# Patient Record
Sex: MALE | Race: WHITE
[De-identification: names, ages, dates, MRNs, and addresses within clinical notes are randomized per-mention and may not be internally consistent; named-entity substitution may affect disease eponyms.]

---

## 2021-09-08 ENCOUNTER — APPOINTMENT (OUTPATIENT)
Dept: DERMATOLOGY | Facility: CLINIC | Age: 31
End: 2021-09-08
Payer: COMMERCIAL

## 2021-09-08 ENCOUNTER — LABORATORY RESULT (OUTPATIENT)
Age: 31
End: 2021-09-08

## 2021-09-08 ENCOUNTER — NON-APPOINTMENT (OUTPATIENT)
Age: 31
End: 2021-09-08

## 2021-09-08 VITALS — HEIGHT: 73 IN | BODY MASS INDEX: 41.75 KG/M2 | WEIGHT: 315 LBS

## 2021-09-08 DIAGNOSIS — Z84.0 FAMILY HISTORY OF DISEASES OF THE SKIN AND SUBCUTANEOUS TISSUE: ICD-10-CM

## 2021-09-08 DIAGNOSIS — D22.9 MELANOCYTIC NEVI, UNSPECIFIED: ICD-10-CM

## 2021-09-08 DIAGNOSIS — Z91.89 OTHER SPECIFIED PERSONAL RISK FACTORS, NOT ELSEWHERE CLASSIFIED: ICD-10-CM

## 2021-09-08 DIAGNOSIS — F17.200 NICOTINE DEPENDENCE, UNSPECIFIED, UNCOMPLICATED: ICD-10-CM

## 2021-09-08 DIAGNOSIS — D48.9 NEOPLASM OF UNCERTAIN BEHAVIOR, UNSPECIFIED: ICD-10-CM

## 2021-09-08 DIAGNOSIS — L81.4 OTHER MELANIN HYPERPIGMENTATION: ICD-10-CM

## 2021-09-08 PROBLEM — Z00.00 ENCOUNTER FOR PREVENTIVE HEALTH EXAMINATION: Status: ACTIVE | Noted: 2021-09-08

## 2021-09-08 PROCEDURE — 99203 OFFICE O/P NEW LOW 30 MIN: CPT | Mod: 25

## 2021-09-08 PROCEDURE — 11102 TANGNTL BX SKIN SINGLE LES: CPT

## 2021-09-08 NOTE — HISTORY OF PRESENT ILLNESS
[FreeTextEntry1] : growth [de-identified] : 31 year old with growth on left eyelid. enlarging. no tx tried.

## 2021-09-08 NOTE — PHYSICAL EXAM
[FreeTextEntry3] : AAOx3, pleasant, NAD, no visual lymphadenopathy\par hair, scalp, face, nose, eyelids, ears, lips, oropharynx, neck, chest, abdomen, back, right arm, left arm, nails, and hands examined with all normal findings,\par pertinent findings include:\par \par multiple benign nevi and lentigines\par verrucous papule on left lateral canthus

## 2021-09-08 NOTE — ASSESSMENT
[FreeTextEntry1] : 1) benign findings as above- education\par \par 2) Shave bx location left lateral canthus\par diagnosis: r/o inflamed FEP\par  \par Shave biopsy performed today over above location, risks and benefits discussed including incomplete removal, not enough tissue for diagnosis scarring and infection, informed consent obtained, pictures taken,  cleaned with alcohol and anesthetized with 1%lido+epi, 0.3 cc total, hemostasis obtained with cautery, vaseline and bandaid placed, tolerated well, wound care reviewed, specimen sent to pathology.\par \par

## 2021-09-24 ENCOUNTER — NON-APPOINTMENT (OUTPATIENT)
Age: 31
End: 2021-09-24

## 2023-08-30 ENCOUNTER — EMERGENCY (EMERGENCY)
Facility: HOSPITAL | Age: 33
LOS: 0 days | Discharge: HOME | End: 2023-08-31
Attending: EMERGENCY MEDICINE
Payer: COMMERCIAL

## 2023-08-30 VITALS
SYSTOLIC BLOOD PRESSURE: 174 MMHG | HEART RATE: 75 BPM | HEIGHT: 73 IN | WEIGHT: 259.93 LBS | DIASTOLIC BLOOD PRESSURE: 112 MMHG | TEMPERATURE: 98 F | OXYGEN SATURATION: 97 % | RESPIRATION RATE: 18 BRPM

## 2023-08-30 DIAGNOSIS — S61.011A LACERATION WITHOUT FOREIGN BODY OF RIGHT THUMB WITHOUT DAMAGE TO NAIL, INITIAL ENCOUNTER: ICD-10-CM
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
## 2023-08-30 PROCEDURE — 73140 X-RAY EXAM OF FINGER(S): CPT | Mod: LT

## 2023-08-30 PROCEDURE — 99284 EMERGENCY DEPT VISIT MOD MDM: CPT | Mod: 25

## 2023-08-30 PROCEDURE — 99283 EMERGENCY DEPT VISIT LOW MDM: CPT | Mod: 25

## 2023-08-30 PROCEDURE — 12001 RPR S/N/AX/GEN/TRNK 2.5CM/<: CPT

## 2023-08-31 VITALS
TEMPERATURE: 98 F | RESPIRATION RATE: 18 BRPM | SYSTOLIC BLOOD PRESSURE: 149 MMHG | HEART RATE: 64 BPM | OXYGEN SATURATION: 100 % | DIASTOLIC BLOOD PRESSURE: 89 MMHG

## 2023-08-31 PROCEDURE — 73140 X-RAY EXAM OF FINGER(S): CPT | Mod: 26,LT

## 2023-08-31 RX ORDER — CEPHALEXIN 500 MG
1 CAPSULE ORAL
Qty: 10 | Refills: 0
Start: 2023-08-31 | End: 2023-09-04

## 2023-08-31 NOTE — ED PROVIDER NOTE - BIRTH SEX
Noted.  I am unsure of any details of pt's court dates or why he needs to miss them - this has never been discussed with the pt previously. Will need more information. Is anyone else in Dr. Mary Marcelo office that can give an order in his absence for a peritoneal drain? Would prefer that GI be managing this, if he is not yet in Hospice, as they are ordering current paracentesis. Male

## 2023-08-31 NOTE — ED PROVIDER NOTE - NSFOLLOWUPINSTRUCTIONS_ED_ALL_ED_FT
Please note that you have a suture placed in your thumb. This suture needs to be removed in 5-7 days. Please return to us immediately if you have any worsening of the symptoms or if any health concerns. Please take the antibiotics as prescribed. Please avoid contact with ocean water, sea critters, or soil. If any signs of infection return to us. Please make sure you see your primary care provider or an orthopedic hand doctor. If you do not have one then I have provided you with the number for one.     Finger Laceration    AMBULATORY CARE:    A finger laceration is a deep cut in your skin. Your blood vessels, bones, joints, tendons, or nerves may also be injured.    Signs and symptoms: Your symptoms may depend on whether nerves, tendons, or deeper tissues were injured. You may have any of the following:    A cut, tear, or gash in your finger    Bleeding, swelling, or pain    Numbness or tingling in your finger    Trouble moving your finger  Seek care immediately if:    Your wound comes apart.    Blood soaks through your bandage.    You have severe pain in your finger or hand.    Your finger is pale and cold.    You have sudden trouble moving your finger.    Your swelling suddenly gets worse.    You have red streaks on your skin coming from your wound.  Call your doctor or hand specialist if:    You have new numbness or tingling.    Your finger feels warm, looks swollen or red, and is draining pus.    You have a fever.    You have questions or concerns about your condition or care.  Treatment for a finger laceration will depend on how large and deep the laceration is. It also depends on whether you have damage to deeper tissues. You may need any of the following:    Pressure may be applied to stop any bleeding.    Wound cleaning may be needed to remove dirt or debris. This will decrease the risk of infection. Your healthcare provider may need to look in your laceration for foreign objects or damage to deeper tissues. Before your laceration is cleaned and checked, you may be given medicine to numb the area. You may also be given medicine to help you relax.    Wound closure with stitches, medical glue, or Steri-Strips™ may be needed. These help the wound close and heal. A splint may be placed over your stitches, glue, or Steri-Strips. This will help decrease stress on the wound and prevent it from coming apart.        Medicine may be given to treat pain or decrease your risk for infection. You may also be given a tetanus shot. Your healthcare provider will decide if you need a tetanus shot. Wounds at high risk for tetanus infection include wounds with dirt or saliva in them. Tell your healthcare provider if you have had the tetanus vaccine or a booster within the last 5 years.    Surgery may be needed to clean your wound and remove foreign objects. Surgery may also be needed to repair injuries to tendons, nerves, or bones.  Self-care:    Apply ice on your finger for 15 to 20 minutes every hour or as directed. Use an ice pack, or put crushed ice in a plastic bag. Cover it with a towel before you apply it to your skin. Ice helps prevent tissue damage and decreases swelling and pain.    Elevate your hand above the level of your heart as often as you can. This will help decrease swelling and pain. Prop your hand on pillows or blankets to keep it elevated comfortably.    Wear your splint as directed. A splint will decrease movement and stress on your wound. The splint may help your wound heal faster. Ask your healthcare provider how to apply and remove a splint.    Apply ointments to decrease scarring. Do not apply ointments until your healthcare provider says it is okay. You may need to wait until your wound is healed. Ask which ointment to buy and how often to use it.  Wound care:    Do not get your wound wet until your healthcare provider says it is okay. Do not soak your hand in water. Do not go swimming until your healthcare provider says it is okay. When your healthcare provider says it is okay, carefully wash around the wound with soap and water. Let soap and water run over your wound. Gently pat the area dry or allow it to air dry.    Change your bandages when they get wet, dirty, or after washing. Apply new, clean bandages as directed. Do not apply elastic bandages or tape too tightly. Do not put powders or lotions on your wound.    Apply antibiotic ointment as directed. Your healthcare provider may give you antibiotic ointment to put over your wound if you have stitches. If you have Strips-Strips™ over your wound, let them dry up and fall off on their own. If they do not fall off within 14 days, gently remove them. If you have glue over your wound, do not remove or pick at it. If your glue comes off, do not replace it with glue that you have at home.    Check your wound every day for signs of infection. Signs of infection include swelling, redness, or pus.  Follow up with your doctor or hand specialist in 2 days: Write down your questions so you remember to ask them during your visits.
36

## 2023-08-31 NOTE — ED PROVIDER NOTE - PROGRESS NOTE DETAILS
Kenton CRUZ: Patient s/p laceration repair, one suture placed, 5-0 prolene, sterile,  using digital block.

## 2023-08-31 NOTE — ED PROVIDER NOTE - CLINICAL SUMMARY MEDICAL DECISION MAKING FREE TEXT BOX
finger laceration, repair, outpatient follow up, strict return precautions provided if any signs of infections.

## 2023-08-31 NOTE — ED PROVIDER NOTE - NEUROLOGICAL, MLM
Alert and oriented, no focal deficits, no motor or sensory deficits. CNs 2-12 grossly intact. GCS=15

## 2023-08-31 NOTE — ED PROVIDER NOTE - CARE PROVIDER_API CALL
Torey Boggs.  Orthopaedic Surgery  [address] [place] [address]  Phone: [phone]  Fax: [phone]  Follow Up Time:

## 2023-08-31 NOTE — ED PROVIDER NOTE - DIFFERENTIAL DIAGNOSIS
finger laceration, repair, outpatient follow up, strict return precautions provided if any signs of infections. Differential Diagnosis

## 2023-08-31 NOTE — ED PROVIDER NOTE - PATIENT PORTAL LINK FT
You can access the FollowMyHealth Patient Portal offered by [facility] by registering at the following website: [email or web address]. By joining [facility]’s FollowMyHealth portal, you will also be able to view your health information using other applications (apps) compatible with our system.

## 2023-08-31 NOTE — ED ADULT NURSE NOTE - OBJECTIVE STATEMENT
Pt in ED c/o laceration on thumb after cutting himself with a camping knife.  Pt denies pain.  Last tdap vaccine in January 2023.  Pt breathing symmetrical and unlabored.  Pt in no acute distress at this time.

## 2023-08-31 NOTE — ED PROVIDER NOTE - OBJECTIVE STATEMENT
33 year old male with thumb laceration. 33 year old male with thumb laceration s/p accidentally cutting it with a camping knife. No difficulty moving the fingers or loss of sensation. No wrist pain. no elbow pain. no shoulder pain. No cp, sob or palpitation. No abdominal pain. No recent trauma.

## 2023-08-31 NOTE — ED ADULT NURSE NOTE - NSFALLUNIVINTERV_ED_ALL_ED
Bed/Stretcher in lowest position, wheels locked, appropriate side rails in place/Call bell, personal items and telephone in reach/Instruct patient to call for assistance before getting out of bed/chair/stretcher/Non-slip footwear applied when patient is off stretcher/[place] to call system/Physically safe environment - no spills, clutter or unnecessary equipment/Purposeful proactive rounding/Room/bathroom lighting operational, light cord in reach
